# Patient Record
Sex: FEMALE | Race: WHITE | Employment: FULL TIME | URBAN - METROPOLITAN AREA
[De-identification: names, ages, dates, MRNs, and addresses within clinical notes are randomized per-mention and may not be internally consistent; named-entity substitution may affect disease eponyms.]

---

## 2018-03-29 ENCOUNTER — OFFICE VISIT (OUTPATIENT)
Dept: OBGYN CLINIC | Age: 23
End: 2018-03-29

## 2018-03-29 VITALS
BODY MASS INDEX: 35.01 KG/M2 | HEIGHT: 68 IN | WEIGHT: 231 LBS | SYSTOLIC BLOOD PRESSURE: 140 MMHG | DIASTOLIC BLOOD PRESSURE: 80 MMHG

## 2018-03-29 DIAGNOSIS — Z09 POSTOP CHECK: Primary | ICD-10-CM

## 2018-03-29 PROCEDURE — 99024 POSTOP FOLLOW-UP VISIT: CPT | Performed by: OBSTETRICS & GYNECOLOGY

## 2018-03-29 RX ORDER — DOXYCYCLINE 100 MG/1
TABLET ORAL
COMMUNITY
Start: 2018-03-15 | End: 2019-06-11

## 2018-03-29 RX ORDER — IBUPROFEN 600 MG/1
600 TABLET ORAL
COMMUNITY
Start: 2018-03-14 | End: 2019-06-11

## 2018-07-02 ENCOUNTER — HOSPITAL ENCOUNTER (OUTPATIENT)
Age: 23
Setting detail: SPECIMEN
Discharge: HOME | End: 2018-07-02
Payer: COMMERCIAL

## 2018-07-02 ENCOUNTER — OFFICE VISIT (OUTPATIENT)
Dept: OBGYN CLINIC | Age: 23
End: 2018-07-02
Payer: COMMERCIAL

## 2018-07-02 VITALS
BODY MASS INDEX: 33.86 KG/M2 | HEIGHT: 68 IN | DIASTOLIC BLOOD PRESSURE: 70 MMHG | SYSTOLIC BLOOD PRESSURE: 118 MMHG | WEIGHT: 223.4 LBS

## 2018-07-02 DIAGNOSIS — N89.8 VAGINAL DISCHARGE: ICD-10-CM

## 2018-07-02 DIAGNOSIS — Z01.419 VISIT FOR GYNECOLOGIC EXAMINATION: Primary | ICD-10-CM

## 2018-07-02 LAB
DIRECT EXAM: NORMAL
Lab: NORMAL
SPECIMEN DESCRIPTION: NORMAL
STATUS: NORMAL

## 2018-07-02 PROCEDURE — 99395 PREV VISIT EST AGE 18-39: CPT | Performed by: OBSTETRICS & GYNECOLOGY

## 2018-07-02 ASSESSMENT — ENCOUNTER SYMPTOMS
BACK PAIN: 0
ABDOMINAL DISTENTION: 0
ABDOMINAL PAIN: 0
CONSTIPATION: 0
COUGH: 0
SHORTNESS OF BREATH: 0
DIARRHEA: 0

## 2018-07-02 NOTE — PROGRESS NOTES
Subjective:      Patient ID: Ming Martinez is a 26 y.o. female. Other   Pertinent negatives include no abdominal pain, chest pain, congestion, coughing, fatigue or headaches. Ming Martinez is a 26 y.o. Napolean Gilbert, here for her annual exam.  The patient was seen and examined. The patients past medical, surgical, social and family history were reviewed. Current medications and allergies were reviewed, and documented in the chart. The patient is now 3 months after her miscarriage and her menses have gone back to monthly using 2 or 3 pads per day. She still says she needs no birth control and she is not sexually active. She works in a [employer] in it's very hot in the summer  She has a white discharge. She was told by her PCP in the past that she had a bacterial infection was treated with Bactrim which turned out to be an allergy. Menstrual history: Monthly, using 2-3 pads  Birth control: Not desired    Blood pressure 118/70, height 5' 8\" (1.727 m), weight 223 lb 6.4 oz (101.3 kg), last menstrual period 06/11/2018, not currently breastfeeding. Wt Readings from Last 3 Encounters:   07/02/18 223 lb 6.4 oz (101.3 kg)   03/29/18 231 lb (104.8 kg)     No results found for this or any previous visit (from the past 8736 hour(s)). No past medical history on file.                                                                 Past Surgical History:   Procedure Laterality Date    DILATION AND CURETTAGE OF UTERUS  03/14/2018    missed AB     Family History   Problem Relation Age of Onset    No Known Problems Father      History   Smoking Status    Never Smoker   Smokeless Tobacco    Never Used     History   Alcohol Use    Yes     Comment: socially       MEDICATIONS:  Current Outpatient Prescriptions   Medication Sig Dispense Refill    doxycycline monohydrate (ADOXA) 100 MG tablet       ibuprofen (ADVIL;MOTRIN) 600 MG tablet Take 600 mg by mouth       No current facility-administered medications for this visit. ALLERGIES:  Patient has no known allergies. Review of Systems   Constitutional: Negative for appetite change and fatigue. HENT: Negative for congestion and hearing loss. Eyes: Negative for visual disturbance. Respiratory: Negative for cough and shortness of breath. Cardiovascular: Negative for chest pain and palpitations. Gastrointestinal: Negative for abdominal distention, abdominal pain, constipation and diarrhea. Genitourinary: Negative for flank pain, frequency, menstrual problem, pelvic pain and vaginal discharge. Musculoskeletal: Negative for back pain. Neurological: Negative for syncope and headaches. Psychiatric/Behavioral: Negative for behavioral problems. Objective:   Physical Exam   Constitutional: She is oriented to person, place, and time. She appears well-developed and well-nourished. Eyes: Pupils are equal, round, and reactive to light. Neck: No tracheal deviation present. No thyromegaly present. Cardiovascular: Normal rate, regular rhythm and normal heart sounds. Pulmonary/Chest: Effort normal and breath sounds normal. No respiratory distress. Right breast exhibits no inverted nipple. Left breast exhibits no inverted nipple, no mass, no nipple discharge, no skin change and no tenderness. Breasts are symmetrical.   Abdominal: Soft. Bowel sounds are normal. She exhibits no distension and no mass. There is no tenderness. There is no CVA tenderness. Hernia confirmed negative in the right inguinal area and confirmed negative in the left inguinal area. Genitourinary: No breast swelling, tenderness, discharge or bleeding. There is no rash or lesion on the right labia. There is no rash or lesion on the left labia. Uterus is not deviated, not enlarged and not fixed. Cervix exhibits no motion tenderness, no discharge and no friability. Right adnexum displays no mass, no tenderness and no fullness.  Left adnexum displays no mass, no tenderness and no

## 2018-07-20 LAB
CYTOLOGY REPORT: NORMAL
HPV SAMPLE: NORMAL
HPV SOURCE: NORMAL
HPV, GENOTYPE 16: NOT DETECTED
HPV, GENOTYPE 18: NOT DETECTED
HPV, HIGH RISK OTHER: NOT DETECTED
HPV, INTERPRETATION: NORMAL

## 2019-06-11 ENCOUNTER — OFFICE VISIT (OUTPATIENT)
Dept: FAMILY MEDICINE CLINIC | Age: 24
End: 2019-06-11
Payer: COMMERCIAL

## 2019-06-11 VITALS
DIASTOLIC BLOOD PRESSURE: 80 MMHG | SYSTOLIC BLOOD PRESSURE: 132 MMHG | HEIGHT: 67 IN | BODY MASS INDEX: 35.63 KG/M2 | OXYGEN SATURATION: 98 % | HEART RATE: 76 BPM | WEIGHT: 227 LBS | TEMPERATURE: 97.2 F

## 2019-06-11 DIAGNOSIS — Z23 NEED FOR HPV VACCINE: ICD-10-CM

## 2019-06-11 DIAGNOSIS — Z11.4 SCREENING FOR HIV (HUMAN IMMUNODEFICIENCY VIRUS): ICD-10-CM

## 2019-06-11 DIAGNOSIS — L30.9 DERMATITIS: Primary | ICD-10-CM

## 2019-06-11 DIAGNOSIS — R42 LIGHT HEADED: ICD-10-CM

## 2019-06-11 DIAGNOSIS — Z76.89 ESTABLISHING CARE WITH NEW DOCTOR, ENCOUNTER FOR: ICD-10-CM

## 2019-06-11 DIAGNOSIS — E66.9 CLASS 2 OBESITY WITHOUT SERIOUS COMORBIDITY WITH BODY MASS INDEX (BMI) OF 35.0 TO 35.9 IN ADULT, UNSPECIFIED OBESITY TYPE: ICD-10-CM

## 2019-06-11 PROCEDURE — G8417 CALC BMI ABV UP PARAM F/U: HCPCS | Performed by: NURSE PRACTITIONER

## 2019-06-11 PROCEDURE — 99203 OFFICE O/P NEW LOW 30 MIN: CPT | Performed by: NURSE PRACTITIONER

## 2019-06-11 RX ORDER — AMMONIUM LACTATE 12 G/100G
LOTION TOPICAL
Qty: 57 G | Refills: 0 | Status: SHIPPED | OUTPATIENT
Start: 2019-06-11

## 2019-06-11 ASSESSMENT — ENCOUNTER SYMPTOMS
BACK PAIN: 0
SHORTNESS OF BREATH: 0
BLOOD IN STOOL: 0
CHEST TIGHTNESS: 0
ABDOMINAL PAIN: 0
NAUSEA: 0
VOMITING: 0
COLOR CHANGE: 0
COUGH: 0
DIARRHEA: 0
CONSTIPATION: 0
SORE THROAT: 0
APNEA: 0

## 2019-06-11 ASSESSMENT — PATIENT HEALTH QUESTIONNAIRE - PHQ9
SUM OF ALL RESPONSES TO PHQ9 QUESTIONS 1 & 2: 0
SUM OF ALL RESPONSES TO PHQ QUESTIONS 1-9: 0
1. LITTLE INTEREST OR PLEASURE IN DOING THINGS: 0
2. FEELING DOWN, DEPRESSED OR HOPELESS: 0
SUM OF ALL RESPONSES TO PHQ QUESTIONS 1-9: 0

## 2019-06-11 NOTE — PROGRESS NOTES
[address]  [place], [address]  [phone]      Isabel De Souza is a 22 y.o. female who presents today for her  medicalconditions/complaints as noted below. Isabel De Souza is c/o of New Patient (has been a while since seeing ) and Rash (bilateral hands,flank. 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 has been feeling lightheaded lately but does work thirds,drinking water)  . HPI:    Pt here to [facility]. Lives here in [facility] and works in [address] [place] [address] in a [employer] Pt also c/o of being light headed for past few weeks. She has had no change in work routine, sleep wake cycle, diet, exercise regimen and feels she eats every few hours. She does drink plenty of water. She states she has felt like she could pass out but has not done so. She does have random headaches but nothing consistent or r/t light headedness. Rash   This is a chronic problem. The current episode started more than 1 year ago (since possible around age 16). The problem has been waxing and waning since onset. The affected locations include the left hand and right hand. The rash is characterized by dryness and itchiness. She was exposed to nothing. Pertinent negatives include no anorexia, congestion, cough, diarrhea, fatigue, fever, joint pain, shortness of breath, sore throat or vomiting. Past treatments include topical steroids and moisturizer. The treatment provided no relief. (Pt has seen 3 derm's with no solid dx, she states this comes and goes with temp extremes, does not seem to be affected by her job)       No past medical history on file.    Past Surgical History:   Procedure Laterality Date    DILATION AND CURETTAGE OF UTERUS  03/14/2018    missed AB     Family History   Problem Relation Age of Onset    No Known Problems Mother     No Known Problems Father     No Known Problems Sister     No Known Problems Brother     No Known Problems Sister     No Known Problems Sister      Social History     Tobacco Use    Smoking status: Never Smoker    Smokeless tobacco: Never Used   Substance Use Topics    Alcohol use: Yes     Comment: socially      Current Outpatient Medications   Medication Sig Dispense Refill    ammonium lactate (LAC-HYDRIN) 12 % lotion Apply topically daily. 57 g 0     No current facility-administered medications for this visit. Allergies   Allergen Reactions    Bactrim [Sulfamethoxazole-Trimethoprim]        Health Maintenance   Topic Date Due    HPV vaccine (1 - Female 3-dose series) 02/03/2011    HIV screen  02/03/2011    Chlamydia screen  02/03/2012    Flu vaccine (Season Ended) 09/01/2019    Cervical cancer screen  07/02/2021    DTaP/Tdap/Td vaccine (7 - Td) 12/02/2026    Varicella Vaccine  Completed    Pneumococcal 0-64 years Vaccine  Aged Out       Subjective:      Review of Systems   Constitutional: Negative for activity change, appetite change, chills, diaphoresis, fatigue and fever. HENT: Negative for congestion and sore throat. Eyes: Negative for visual disturbance. Respiratory: Negative for apnea, cough, chest tightness and shortness of breath. Cardiovascular: Negative for chest pain, palpitations and leg swelling. Gastrointestinal: Negative for abdominal pain, anorexia, blood in stool, constipation, diarrhea, nausea and vomiting. Genitourinary: Negative for difficulty urinating and menstrual problem (normal light cycles each month). Musculoskeletal: Negative for back pain, joint pain and myalgias. Skin: Positive for rash. Negative for color change and pallor. Neurological: Positive for light-headedness and headaches. Negative for dizziness, syncope, speech difficulty, weakness and numbness. Hematological: Negative for adenopathy. Psychiatric/Behavioral: Negative for dysphoric mood and sleep disturbance. The patient is not nervous/anxious.           Objective:      Physical Exam   Constitutional: She is oriented to person, place, and time. She appears well-developed and well-nourished. No distress. HENT:   Head: Normocephalic and atraumatic. Neck: Neck supple. No JVD present. No thyromegaly present. Cardiovascular: Normal rate, regular rhythm, normal heart sounds and intact distal pulses. No LE edema   Pulmonary/Chest: Effort normal and breath sounds normal.   Lymphadenopathy:     She has no cervical adenopathy. Neurological: She is alert and oriented to person, place, and time. Skin: Skin is warm and dry. Rash (papular rash to bilateral tops of hands with very mild erythema, possible eczema?) noted. She is not diaphoretic. No pallor. Psychiatric: She has a normal mood and affect. Her behavior is normal. Judgment and thought content normal.   Nursing note and vitals reviewed. Assessment:       Diagnosis Orders   1. Dermatitis  ammonium lactate (LAC-HYDRIN) 12 % lotion    CBC Auto Differential   2. Establishing care with new doctor, encounter for     3. Light headed  Comprehensive Metabolic Panel    CBC Auto Differential    TSH without Reflex   4. Class 2 obesity without serious comorbidity with body mass index (BMI) of 35.0 to 35.9 in adult, unspecified obesity type  IA CALC BMI ABV UP SATYA F/U   5. Screening for HIV (human immunodeficiency virus)  HIV Screen     Wt Readings from Last 3 Encounters:   06/11/19 227 lb (103 kg)   07/02/18 223 lb 6.4 oz (101.3 kg)   03/29/18 231 lb (104.8 kg)     BP Readings from Last 3 Encounters:   06/11/19 132/80   07/02/18 118/70   03/29/18 (!) 140/80       Plan:      Return if symptoms worsen or fail to improve.   Orders Placed This Encounter   Procedures    Comprehensive Metabolic Panel     Standing Status:   Future     Standing Expiration Date:   6/10/2020    CBC Auto Differential     Standing Status:   Future     Standing Expiration Date:   6/10/2020    TSH without Reflex     Standing Status:   Future     Standing Expiration Date:   6/10/2020    HIV Screen Standing Status:   Future     Standing Expiration Date:   6/10/2020    VA CALC BMI ABV UP SATYA F/U     Orders Placed This Encounter   Medications    ammonium lactate (LAC-HYDRIN) 12 % lotion     Sig: Apply topically daily. Dispense:  57 g     Refill:  0     Health Maintenance reviewed - patient asked to schedule her pap smear ( had ASc-US on last pap). 1st HPV vaccine given today, f/u 2 months for 2nd    Rash:\"  Will try lac hydrin BID for now as it appears dry, she has tried rx and otc hydrocortisone with only short term results  Advised to make f/u appt if flares up again and will do punch bx    Light headed:  Continue to eat every few hours with colorful foods  push fluids ( water, Gatorade, tea), and rest as much as able  Check other labs  Will call with test results    Patient advised to follow heart healthy, low fat  diet and 150 minutes of cardiovascular exercise per week     Patient given educational materials - see patient instructions. Discussed use,benefit, and side effects of prescribed medications. All patient questions answered. Pt voiced understanding. Reviewed health maintenance. Instructed to continue currentmedications, diet and exercise.     Electronically signed by Melvi Condon CNP on 6/11/2019 at 9:31 AM

## 2019-06-11 NOTE — PROGRESS NOTES
Visit Information    Have you changed or started any medications since your last visit including any over-the-counter medicines, vitamins, or herbal medicines? no   Have you stopped taking any of your medications? Is so, why? -  no  Are you having any side effects from any of your medications? - no    Have you seen any other physician or provider since your last visit?  no   Have you had any other diagnostic tests since your last visit?  no   Have you been seen in the emergency room and/or had an admission in a hospital since we last saw you?  no   Have you had your routine dental cleaning in the past 6 months?  no     Do you have an active MyChart account? If no, what is the barrier? No: na    Patient Care Team:  VERO Doyle CNP as PCP - General (Family Nurse Practitioner)    Medical History Review  Past Medical, Family, and Social History reviewed and  contribute to the patient presenting condition    Health Maintenance   Topic Date Due    Varicella Vaccine (1 of 2 - 13+ 2-dose series) 02/03/2009    HPV vaccine (1 - Female 3-dose series) 02/03/2011    HIV screen  02/03/2011    Chlamydia screen  02/03/2012    DTaP/Tdap/Td vaccine (1 - Tdap) 02/03/2015    Flu vaccine (Season Ended) 09/01/2019    Cervical cancer screen  07/02/2021    Pneumococcal 0-64 years Vaccine  Aged Out     After obtaining consent, and per orders of Lola Gonzalez CNP, injection of Gardasil given in Right deltoid by Isaiah . Patient instructed to remain in clinic for 20 minutes afterwards, and to report any adverse reaction to me immediately.

## 2019-06-13 ENCOUNTER — HOSPITAL ENCOUNTER (OUTPATIENT)
Age: 24
Setting detail: SPECIMEN
Discharge: HOME | End: 2019-06-13
Payer: COMMERCIAL

## 2019-06-13 DIAGNOSIS — Z11.4 SCREENING FOR HIV (HUMAN IMMUNODEFICIENCY VIRUS): ICD-10-CM

## 2019-06-13 DIAGNOSIS — R42 LIGHT HEADED: ICD-10-CM

## 2019-06-13 DIAGNOSIS — L30.9 DERMATITIS: ICD-10-CM

## 2019-06-13 LAB
ABSOLUTE EOS #: 0.22 K/UL (ref 0–0.44)
ABSOLUTE IMMATURE GRANULOCYTE: 0.04 K/UL (ref 0–0.3)
ABSOLUTE LYMPH #: 3.05 K/UL (ref 1.1–3.7)
ABSOLUTE MONO #: 1.01 K/UL (ref 0.1–1.2)
BASOPHILS # BLD: 1 % (ref 0–2)
BASOPHILS ABSOLUTE: 0.07 K/UL (ref 0–0.2)
DIFFERENTIAL TYPE: NORMAL
EOSINOPHILS RELATIVE PERCENT: 2 % (ref 1–4)
HCT VFR BLD CALC: 42.8 % (ref 36.3–47.1)
HEMOGLOBIN: 12.8 G/DL (ref 11.9–15.1)
IMMATURE GRANULOCYTES: 0 %
LYMPHOCYTES # BLD: 33 % (ref 24–43)
MCH RBC QN AUTO: 27 PG (ref 25.2–33.5)
MCHC RBC AUTO-ENTMCNC: 29.9 G/DL (ref 28.4–34.8)
MCV RBC AUTO: 90.3 FL (ref 82.6–102.9)
MONOCYTES # BLD: 11 % (ref 3–12)
NRBC AUTOMATED: 0 PER 100 WBC
PDW BLD-RTO: 13.6 % (ref 11.8–14.4)
PLATELET # BLD: 319 K/UL (ref 138–453)
PLATELET ESTIMATE: NORMAL
PMV BLD AUTO: 11.3 FL (ref 8.1–13.5)
RBC # BLD: 4.74 M/UL (ref 3.95–5.11)
RBC # BLD: NORMAL 10*6/UL
SEG NEUTROPHILS: 53 % (ref 36–65)
SEGMENTED NEUTROPHILS ABSOLUTE COUNT: 5 K/UL (ref 1.5–8.1)
WBC # BLD: 9.4 K/UL (ref 3.5–11.3)
WBC # BLD: NORMAL 10*3/UL

## 2019-06-14 LAB
ALBUMIN SERPL-MCNC: 4.7 G/DL (ref 3.5–5.2)
ALBUMIN/GLOBULIN RATIO: 1.8 (ref 1–2.5)
ALP BLD-CCNC: 60 U/L (ref 35–104)
ALT SERPL-CCNC: 28 U/L (ref 5–33)
ANION GAP SERPL CALCULATED.3IONS-SCNC: 15 MMOL/L (ref 9–17)
AST SERPL-CCNC: 26 U/L
BILIRUB SERPL-MCNC: 0.32 MG/DL (ref 0.3–1.2)
BUN BLDV-MCNC: 16 MG/DL (ref 6–20)
BUN/CREAT BLD: NORMAL (ref 9–20)
CALCIUM SERPL-MCNC: 9.5 MG/DL (ref 8.6–10.4)
CHLORIDE BLD-SCNC: 102 MMOL/L (ref 98–107)
CO2: 21 MMOL/L (ref 20–31)
CREAT SERPL-MCNC: 0.7 MG/DL (ref 0.5–0.9)
GFR AFRICAN AMERICAN: >60 ML/MIN
GFR NON-AFRICAN AMERICAN: >60 ML/MIN
GFR SERPL CREATININE-BSD FRML MDRD: NORMAL ML/MIN/{1.73_M2}
GFR SERPL CREATININE-BSD FRML MDRD: NORMAL ML/MIN/{1.73_M2}
GLUCOSE BLD-MCNC: 77 MG/DL (ref 70–99)
HIV AG/AB: NONREACTIVE
POTASSIUM SERPL-SCNC: 4.4 MMOL/L (ref 3.7–5.3)
SODIUM BLD-SCNC: 138 MMOL/L (ref 135–144)
TOTAL PROTEIN: 7.3 G/DL (ref 6.4–8.3)
TSH SERPL DL<=0.05 MIU/L-ACNC: 3.09 MIU/L (ref 0.3–5)

## 2019-08-16 ENCOUNTER — OFFICE VISIT (OUTPATIENT)
Dept: FAMILY MEDICINE CLINIC | Age: 24
End: 2019-08-16
Payer: COMMERCIAL

## 2019-08-16 VITALS
DIASTOLIC BLOOD PRESSURE: 77 MMHG | TEMPERATURE: 97.3 F | WEIGHT: 224 LBS | BODY MASS INDEX: 35.16 KG/M2 | OXYGEN SATURATION: 99 % | HEART RATE: 76 BPM | SYSTOLIC BLOOD PRESSURE: 116 MMHG | HEIGHT: 67 IN

## 2019-08-16 DIAGNOSIS — M79.641 RIGHT HAND PAIN: Primary | ICD-10-CM

## 2019-08-16 DIAGNOSIS — M25.531 RIGHT WRIST PAIN: ICD-10-CM

## 2019-08-16 DIAGNOSIS — Z23 NEED FOR HPV VACCINE: ICD-10-CM

## 2019-08-16 PROCEDURE — 90651 9VHPV VACCINE 2/3 DOSE IM: CPT | Performed by: NURSE PRACTITIONER

## 2019-08-16 PROCEDURE — 99213 OFFICE O/P EST LOW 20 MIN: CPT | Performed by: NURSE PRACTITIONER

## 2019-08-16 PROCEDURE — 90471 IMMUNIZATION ADMIN: CPT | Performed by: NURSE PRACTITIONER

## 2019-08-16 RX ORDER — PREDNISONE 20 MG/1
20 TABLET ORAL 2 TIMES DAILY
Qty: 10 TABLET | Refills: 0 | Status: SHIPPED | OUTPATIENT
Start: 2019-08-16 | End: 2019-08-21

## 2019-08-16 ASSESSMENT — ENCOUNTER SYMPTOMS: SHORTNESS OF BREATH: 0

## 2019-08-16 NOTE — PROGRESS NOTES
Visit Information    Have you changed or started any medications since your last visit including any over-the-counter medicines, vitamins, or herbal medicines? no   Have you stopped taking any of your medications? Is so, why? -  no  Are you having any side effects from any of your medications? - no    Have you seen any other physician or provider since your last visit?  no   Have you had any other diagnostic tests since your last visit?  no   Have you been seen in the emergency room and/or had an admission in a hospital since we last saw you?  no   Have you had your routine dental cleaning in the past 6 months?  no     Do you have an active MyChart account? If no, what is the barrier? No: na    Patient Care Team:  VERO Nickerson CNP as PCP - General (Family Nurse Practitioner)  VERO Nickerson CNP as PCP - [facility] Provider    Medical History Review  Past Medical, Family, and Social History reviewed and  contribute to the patient presenting condition    Health Maintenance   Topic Date Due    Chlamydia screen  02/03/2012    HPV vaccine (2 - Female 3-dose series) 07/09/2019    Flu vaccine (1) 09/01/2019    Cervical cancer screen  07/02/2021    DTaP/Tdap/Td vaccine (7 - Td) 12/02/2026    Varicella Vaccine  Completed    HIV screen  Completed    Pneumococcal 0-64 years Vaccine  Aged Out     After obtaining consent, and per orders of Grant Ceron CNP, injection of Gardasil given in Left deltoid by Garima Johansen. Patient instructed to remain in clinic for 20 minutes afterwards, and to report any adverse reaction to me immediately.

## 2019-10-29 ENCOUNTER — HOSPITAL ENCOUNTER (OUTPATIENT)
Age: 24
Setting detail: SPECIMEN
Discharge: HOME | End: 2019-10-29
Payer: COMMERCIAL

## 2019-10-29 ENCOUNTER — OFFICE VISIT (OUTPATIENT)
Dept: FAMILY MEDICINE CLINIC | Age: 24
End: 2019-10-29
Payer: COMMERCIAL

## 2019-10-29 VITALS
HEIGHT: 67 IN | HEART RATE: 105 BPM | WEIGHT: 231 LBS | SYSTOLIC BLOOD PRESSURE: 116 MMHG | TEMPERATURE: 98.1 F | DIASTOLIC BLOOD PRESSURE: 70 MMHG | BODY MASS INDEX: 36.26 KG/M2 | OXYGEN SATURATION: 100 %

## 2019-10-29 DIAGNOSIS — Z23 FLU VACCINE NEED: ICD-10-CM

## 2019-10-29 DIAGNOSIS — Z11.8 SCREENING FOR CHLAMYDIAL DISEASE: ICD-10-CM

## 2019-10-29 DIAGNOSIS — Z12.4 SCREENING FOR CERVICAL CANCER: Primary | ICD-10-CM

## 2019-10-29 PROCEDURE — 90686 IIV4 VACC NO PRSV 0.5 ML IM: CPT | Performed by: NURSE PRACTITIONER

## 2019-10-29 PROCEDURE — 90471 IMMUNIZATION ADMIN: CPT | Performed by: NURSE PRACTITIONER

## 2019-10-29 PROCEDURE — 99395 PREV VISIT EST AGE 18-39: CPT | Performed by: NURSE PRACTITIONER

## 2019-10-30 LAB
C TRACH DNA GENITAL QL NAA+PROBE: NEGATIVE
SPECIMEN DESCRIPTION: NORMAL

## 2019-11-11 LAB — CYTOLOGY REPORT: NORMAL

## 2019-11-26 ENCOUNTER — NURSE ONLY (OUTPATIENT)
Dept: FAMILY MEDICINE CLINIC | Age: 24
End: 2019-11-26
Payer: COMMERCIAL

## 2019-11-26 DIAGNOSIS — Z23 NEED FOR HPV VACCINE: Primary | ICD-10-CM

## 2019-11-26 PROCEDURE — 90651 9VHPV VACCINE 2/3 DOSE IM: CPT | Performed by: NURSE PRACTITIONER

## 2019-11-26 PROCEDURE — 90471 IMMUNIZATION ADMIN: CPT | Performed by: NURSE PRACTITIONER

## 2021-08-28 ENCOUNTER — HOSPITAL ENCOUNTER (OUTPATIENT)
Age: 26
Setting detail: SPECIMEN
Discharge: HOME | End: 2021-08-28
Payer: COMMERCIAL

## 2021-08-28 ENCOUNTER — OFFICE VISIT (OUTPATIENT)
Dept: PRIMARY CARE CLINIC | Age: 26
End: 2021-08-28
Payer: COMMERCIAL

## 2021-08-28 VITALS
OXYGEN SATURATION: 98 % | HEIGHT: 68 IN | HEART RATE: 85 BPM | DIASTOLIC BLOOD PRESSURE: 83 MMHG | SYSTOLIC BLOOD PRESSURE: 122 MMHG | TEMPERATURE: 97.2 F | BODY MASS INDEX: 35.12 KG/M2

## 2021-08-28 DIAGNOSIS — R06.02 SOB (SHORTNESS OF BREATH): ICD-10-CM

## 2021-08-28 DIAGNOSIS — J01.90 ACUTE SINUSITIS, RECURRENCE NOT SPECIFIED, UNSPECIFIED LOCATION: ICD-10-CM

## 2021-08-28 DIAGNOSIS — R05.9 COUGH: Primary | ICD-10-CM

## 2021-08-28 PROCEDURE — 99213 OFFICE O/P EST LOW 20 MIN: CPT | Performed by: PHYSICIAN ASSISTANT

## 2021-08-28 PROCEDURE — G8427 DOCREV CUR MEDS BY ELIG CLIN: HCPCS | Performed by: PHYSICIAN ASSISTANT

## 2021-08-28 PROCEDURE — G8417 CALC BMI ABV UP PARAM F/U: HCPCS | Performed by: PHYSICIAN ASSISTANT

## 2021-08-28 PROCEDURE — 1036F TOBACCO NON-USER: CPT | Performed by: PHYSICIAN ASSISTANT

## 2021-08-28 RX ORDER — BENZONATATE 200 MG/1
200 CAPSULE ORAL 3 TIMES DAILY PRN
Qty: 21 CAPSULE | Refills: 0 | Status: SHIPPED | OUTPATIENT
Start: 2021-08-28 | End: 2021-09-04

## 2021-08-28 RX ORDER — AMOXICILLIN AND CLAVULANATE POTASSIUM 875; 125 MG/1; MG/1
1 TABLET, FILM COATED ORAL 2 TIMES DAILY
Qty: 20 TABLET | Refills: 0 | Status: SHIPPED | OUTPATIENT
Start: 2021-08-28 | End: 2021-09-07

## 2021-08-28 ASSESSMENT — PATIENT HEALTH QUESTIONNAIRE - PHQ9
SUM OF ALL RESPONSES TO PHQ QUESTIONS 1-9: 0
2. FEELING DOWN, DEPRESSED OR HOPELESS: 0
1. LITTLE INTEREST OR PLEASURE IN DOING THINGS: 0
SUM OF ALL RESPONSES TO PHQ QUESTIONS 1-9: 0
SUM OF ALL RESPONSES TO PHQ QUESTIONS 1-9: 0
SUM OF ALL RESPONSES TO PHQ9 QUESTIONS 1 & 2: 0

## 2021-08-28 NOTE — PATIENT INSTRUCTIONS
Patient Education        Coronavirus ([id number]): Care Instructions  Overview  The coronavirus disease (COVID-19) is caused by a virus. Symptoms may include a fever, a cough, and shortness of breath. It mainly spreads person-to-person through droplets from coughing and sneezing. The virus also can spread when people are in close contact with someone who is infected. Most people have mild symptoms and can take care of themselves at home. If their symptoms get worse, they may need care in a hospital. Treatment may include medicines to reduce symptoms, plus breathing support such as oxygen therapy or a ventilator. It's important to not spread the virus to others. If you have COVID-19, wear a face cover anytime you are around other people. It can help stop the spread of the virus. You need to isolate yourself while you are sick. Leave your home only if you need to get medical care or testing. Follow-up care is a key part of your treatment and safety. Be sure to make and go to all appointments, and call your doctor if you are having problems. It's also a good idea to know your test results and keep a list of the medicines you take. How can you care for yourself at home? · Get extra rest. It can help you feel better. · Drink plenty of fluids. This helps replace fluids lost from fever. Fluids also help ease a scratchy throat. Water, soup, fruit juice, and hot tea with lemon are good choices. · Take acetaminophen (such as Tylenol) to reduce a fever. It may also help with muscle aches. Read and follow all instructions on the label. · Use petroleum jelly on sore skin. This can help if the skin around your nose and lips becomes sore from rubbing a lot with tissues. If you use oxygen, use a water-based product instead of petroleum jelly. Tips for self-isolation  · Limit contact with people in your home. If possible, stay in a separate bedroom and use a separate bathroom.   · Wear a cloth face cover when you are around out (lose consciousness) or are very hard to wake up. Call your doctor now or seek immediate medical care if:    · You have moderate trouble breathing. (You can't speak a full sentence.)     · You are coughing up blood (more than about 1 teaspoon).     · You have signs of low blood pressure. These include feeling lightheaded; being too weak to stand; and having cold, pale, clammy skin. Watch closely for changes in your health, and be sure to contact your doctor if:    · Your symptoms get worse.     · You are not getting better as expected. Call before you go to the doctor's office. Follow their instructions. And wear a cloth face cover. Current as of: March 26, 2021               Content Version: 12.9  © 2006-2021 Healthwise, Incorporated. Care instructions adapted under license by [facility] ([facility]). If you have questions about a medical condition or this instruction, always ask your healthcare professional. [place] [address] any warranty or liability for your use of this information. Patient Education        Cough: Care Instructions  Your Care Instructions     A cough is your body's response to something that bothers your throat or airways. Many things can cause a cough. You might cough because of a cold or the flu, bronchitis, or asthma. Smoking, postnasal drip, allergies, and stomach acid that backs up into your throat also can cause coughs. A cough is a symptom, not a disease. Most coughs stop when the cause, such as a cold, goes away. You can take a few steps at home to cough less and feel better. Follow-up care is a key part of your treatment and safety. Be sure to make and go to all appointments, and call your doctor if you are having problems. It's also a good idea to know your test results and keep a list of the medicines you take. How can you care for yourself at home? · Drink lots of water and other fluids. This helps thin the mucus and soothes a dry or sore throat.  Honey or lemon juice in hot water or tea may ease a dry cough. · Take cough medicine as directed by your doctor. · Prop up your head on pillows to help you breathe and ease a dry cough. · Try cough drops to soothe a dry or sore throat. Cough drops don't stop a cough. Medicine-flavored cough drops are no better than candy-flavored drops or hard candy. · Do not smoke. Avoid secondhand smoke. If you need help quitting, talk to your doctor about stop-smoking programs and medicines. These can increase your chances of quitting for good. When should you call for help? Call 911 anytime you think you may need emergency care. For example, call if:    · You have severe trouble breathing. Call your doctor now or seek immediate medical care if:    · You cough up blood.     · You have new or worse trouble breathing.     · You have a new or higher fever.     · You have a new rash. Watch closely for changes in your health, and be sure to contact your doctor if:    · You cough more deeply or more often, especially if you notice more mucus or a change in the color of your mucus.     · You have new symptoms, such as a sore throat, an earache, or sinus pain.     · You do not get better as expected. Where can you learn more? Go to https://Formabilio.Admatic. org and sign in to your [employer] account. Enter D279 in the [facility] box to learn more about \"Cough: Care Instructions. \"     If you do not have an account, please click on the \"Sign Up Now\" link. Current as of: October 26, 2020               Content Version: 12.9  © 5637-5016 [employer]. Care instructions adapted under license by [facility] ([facility]). If you have questions about a medical condition or this instruction, always ask your healthcare professional. [place] [address] any warranty or liability for your use of this information.          Patient Education        Sinusitis: Care Instructions  Your Care Instructions Sinusitis is an infection of the lining of the sinus cavities in your head. Sinusitis often follows a cold. It causes pain and pressure in your head and face. In most cases, sinusitis gets better on its own in 1 to 2 weeks. But some mild symptoms may last for several weeks. Sometimes antibiotics are needed. Follow-up care is a key part of your treatment and safety. Be sure to make and go to all appointments, and call your doctor if you are having problems. It's also a good idea to know your test results and keep a list of the medicines you take. How can you care for yourself at home? · Take an over-the-counter pain medicine, such as acetaminophen (Tylenol), ibuprofen (Advil, Motrin), or naproxen (Aleve). Read and follow all instructions on the label. · If the doctor prescribed antibiotics, take them as directed. Do not stop taking them just because you feel better. You need to take the full course of antibiotics. · Be careful when taking over-the-counter cold or flu medicines and Tylenol at the same time. Many of these medicines have acetaminophen, which is Tylenol. Read the labels to make sure that you are not taking more than the recommended dose. Too much acetaminophen (Tylenol) can be harmful. · Breathe warm, moist air from a steamy shower, a hot bath, or a sink filled with hot water. Avoid cold, dry air. Using a humidifier in your home may help. Follow the directions for cleaning the machine. · Use saline (saltwater) nasal washes. This can help keep your nasal passages open and wash out mucus and bacteria. You can buy saline nose drops at a grocery store or drugstore. Or you can make your own at home by adding 1 teaspoon of salt and 1 teaspoon of baking soda to 2 cups of distilled water. If you make your own, fill a bulb syringe with the solution, insert the tip into your nostril, and squeeze gently. Rometta Mering your nose.   · Put a hot, wet towel or a warm gel pack on your face 3 or 4 times a day for 5 to 10 minutes each time. · Try a decongestant nasal spray like oxymetazoline (Afrin). Do not use it for more than 3 days in a row. Using it for more than 3 days can make your congestion worse. When should you call for help? Call your doctor now or seek immediate medical care if:    · You have new or worse swelling or redness in your face or around your eyes.     · You have a new or higher fever. Watch closely for changes in your health, and be sure to contact your doctor if:    · You have new or worse facial pain.     · The mucus from your nose becomes thicker (like pus) or has new blood in it.     · You are not getting better as expected. Where can you learn more? Go to https://Eyebrid Blaze.SoSocio. org and sign in to your [employer] account. Enter C124 in the [employer] box to learn more about \"Sinusitis: Care Instructions. \"     If you do not have an account, please click on the \"Sign Up Now\" link. Current as of: December 2, 2020               Content Version: 12.9  © 8612-2874 [employer]. Care instructions adapted under license by [facility] ([facility]). If you have questions about a medical condition or this instruction, always ask your healthcare professional. [address] any warranty or liability for your use of this information. Patient Education        Saline Nasal Washes: Care Instructions  Your Care Instructions     Saline nasal washes help keep the nasal passages open by washing out thick or dried mucus. This simple remedy can help relieve symptoms of allergies, sinusitis, and colds. It also can make the nose feel more comfortable by keeping the mucous membranes moist. You may notice a little burning sensation in your nose the first few times you use the solution, but this usually gets better in a few days. Follow-up care is a key part of your treatment and safety.  Be sure to make and go to all appointments, and call your doctor if you are having problems. It's also a good idea to know your test results and keep a list of the medicines you take. How can you care for yourself at home? · You can buy premixed saline solution in a squeeze bottle or other sinus rinse products at a drugstore. Read and follow the instructions on the label. · You also can make your own saline solution by adding 1 teaspoon of salt and 1 teaspoon of baking soda to 2 cups of distilled water. · If you use a homemade solution, pour a small amount into a clean bowl. Using a rubber bulb syringe, squeeze the syringe and place the tip in the salt water. Pull a small amount of the salt water into the syringe by relaxing your hand. · Sit down with your head tilted slightly back. Do not lie down. Put the tip of the bulb syringe or the squeeze bottle a little way into one of your nostrils. Gently drip or squirt a few drops into the nostril. Repeat with the other nostril. Some sneezing and gagging are normal at first.  · Gently blow your nose. · Wipe the syringe or bottle tip clean after each use. · Repeat this 2 or 3 times a day. · Use nasal washes gently if you have nosebleeds often. When should you call for help? Watch closely for changes in your health, and be sure to contact your doctor if:    · You often get nosebleeds.     · You have problems doing the nasal washes. Where can you learn more? Go to https://Intuitive Web SolutionspeMiNOWireless.Boxcar. org and sign in to your [employer] account. Enter [phone] in the [facility] box to learn more about \"Saline Nasal Washes: Care Instructions. \"     If you do not have an account, please click on the \"Sign Up Now\" link. Current as of: December 2, 2020               Content Version: 12.9  © 7815-0665 Healthwise, Incorporated. Care instructions adapted under license by [facility] ([facility]).  If you have questions about a medical condition or this instruction, always ask your healthcare professional. Brady Machado disclaims any warranty or liability for your use of this information.

## 2021-08-28 NOTE — LETTER
[place] [address]  Phone: [phone]  Fax: [phone]    Beatriz Bragg        August 28, 2021     Patient: Fausto Aguilar   YOB: 1995   Date of Visit: 8/28/2021       To Whom it May Concern:    Fausto Aguilar was seen in my clinic on 8/28/2021. She is to remain off work until her [place] comes back negative    If you have any questions or concerns, please don't hesitate to call.     Sincerely,         Chary Bello, DAVID

## 2021-08-28 NOTE — PROGRESS NOTES
Surgical History:   Procedure Laterality Date    DILATION AND CURETTAGE OF UTERUS  03/14/2018    missed AB         CURRENT MEDICATIONS       Current Outpatient Medications   Medication Sig Dispense Refill    amoxicillin-clavulanate (AUGMENTIN) 875-125 MG per tablet Take 1 tablet by mouth 2 times daily for 10 days 20 tablet 0    benzonatate (TESSALON) 200 MG capsule Take 1 capsule by mouth 3 times daily as needed for Cough 21 capsule 0    ammonium lactate (LAC-HYDRIN) 12 % lotion Apply topically daily. (Patient not taking: Reported on 8/28/2021) 57 g 0     No current facility-administered medications for this visit. ALLERGIES     Bactrim [sulfamethoxazole-trimethoprim]    FAMILY HISTORY           Problem Relation Age of Onset    No Known Problems Mother     No Known Problems Father     No Known Problems Sister     No Known Problems Brother     No Known Problems Sister     No Known Problems Sister      Family Status   Relation Name Status    Mother  Alive    Father  Alive    Sister  Alive   [place] Brother  Alive    Sister  Alive    Sister  Alive          SOCIAL HISTORY      reports that she has never smoked. She has never used smokeless tobacco. She reports current alcohol use. She reports that she does not use drugs. PHYSICAL EXAM    (up to 7 for level 4, 8 or more for level 5)     Vitals:    08/28/21 1044   BP: 122/83   Site: Right Upper Arm   Position: Sitting   Cuff Size: Large Adult   Pulse: 85   Temp: 97.2 °F (36.2 °C)   TempSrc: Infrared   SpO2: 98%   Height: 5' 8\" (1.727 m)         Physical Exam  Vitals and nursing note reviewed. Constitutional:       General: She is not in acute distress. Appearance: Normal appearance. She is not ill-appearing. HENT:      Head: Normocephalic and atraumatic. Right Ear: External ear normal.      Left Ear: External ear normal.      Nose: Congestion and rhinorrhea present.       Mouth/Throat:      Mouth: Mucous membranes are moist.      Pharynx: ICU for COVID-19? Answer:   No     Order Specific Question:   Employed in healthcare setting? Answer:   No     Order Specific Question:   Resident in a congregate (group) care setting? Answer:   No     Order Specific Question:   Pregnant? Answer:   No     Order Specific Question:   Previously tested for COVID-19? Answer:   Yes       No results found for this visit on 08/28/21. FINALIMPRESSION      Visit Diagnoses and Associated Orders     Cough    -  Primary    COVID-19 [[id number] Custom]   - Future Order    XR CHEST STANDARD (2 VW) [[id number] Custom]   - Future Order         SOB (shortness of breath)        COVID-19 [[id number] Custom]   - Future Order    XR CHEST STANDARD (2 VW) [[id number] Custom]   - Future Order         Acute sinusitis, recurrence not specified, unspecified location        COVID-19 [[id number] Custom]   - Future Order    XR CHEST STANDARD (2 VW) [[id number] Custom]   - Future Order         ORDERS WITHOUT AN ASSOCIATED DIAGNOSIS    amoxicillin-clavulanate (AUGMENTIN) 875-125 MG per tablet [[id number]]      benzonatate (TESSALON) 200 MG capsule [[id number]]              PLAN     Return if symptoms worsen or fail to improve. DISCHARGEMEDICATIONS:  Orders Placed This Encounter   Medications    amoxicillin-clavulanate (AUGMENTIN) 875-125 MG per tablet     Sig: Take 1 tablet by mouth 2 times daily for 10 days     Dispense:  20 tablet     Refill:  0    benzonatate (TESSALON) 200 MG capsule     Sig: Take 1 capsule by mouth 3 times daily as needed for Cough     Dispense:  21 capsule     Refill:  0         Plan:.  Specimen sent for a culture. Possible treatment alteration based on the results. Based on the duration and severity of the symptoms-- I will treat this as bacterial at this time. Patient instructed to complete antibiotic prescription fully. May use Motrin/Tylenol for fever/pain. Saline washes, salt water gargles and over the counter preparations if desired.   Patient agreeable to treatment plan.  Educational materials provided on AVS.  Follow up if symptoms do not improve/worsen. Patient instructed to return to the office if symptoms worsen, return, or have any other concerns. Patient understands and is agreeable.          Linnea Garcia PA-C 8/29/2021 8:36 PM

## 2021-08-29 DIAGNOSIS — J01.90 ACUTE SINUSITIS, RECURRENCE NOT SPECIFIED, UNSPECIFIED LOCATION: ICD-10-CM

## 2021-08-29 DIAGNOSIS — R06.02 SOB (SHORTNESS OF BREATH): ICD-10-CM

## 2021-08-29 DIAGNOSIS — R05.9 COUGH: ICD-10-CM

## 2021-08-29 ASSESSMENT — ENCOUNTER SYMPTOMS
RHINORRHEA: 1
SINUS PAIN: 0
CHEST TIGHTNESS: 0
SINUS PRESSURE: 1
GASTROINTESTINAL NEGATIVE: 1
COUGH: 1
SHORTNESS OF BREATH: 1
EYES NEGATIVE: 1

## 2021-08-30 LAB
SARS-COV-2: NORMAL
SARS-COV-2: NOT DETECTED
SOURCE: NORMAL

## 2021-10-26 ENCOUNTER — HOSPITAL ENCOUNTER (OUTPATIENT)
Age: 26
Discharge: HOME | End: 2021-10-26
Payer: OTHER GOVERNMENT

## 2021-10-26 ENCOUNTER — HOSPITAL ENCOUNTER (OUTPATIENT)
Dept: GENERAL RADIOLOGY | Age: 26
Discharge: HOME | End: 2021-10-28
Payer: OTHER GOVERNMENT

## 2021-10-26 DIAGNOSIS — T14.90XA INJURY: ICD-10-CM

## 2021-10-26 PROCEDURE — 73564 X-RAY EXAM KNEE 4 OR MORE: CPT

## 2021-11-29 ENCOUNTER — HOSPITAL ENCOUNTER (OUTPATIENT)
Dept: MRI IMAGING | Age: 26
Discharge: HOME | End: 2021-12-01
Payer: OTHER GOVERNMENT

## 2021-11-29 DIAGNOSIS — S80.01XA CONTUSION OF RIGHT KNEE, INITIAL ENCOUNTER: ICD-10-CM

## 2021-11-29 PROCEDURE — 73721 MRI JNT OF LWR EXTRE W/O DYE: CPT

## 2021-12-16 ENCOUNTER — HOSPITAL ENCOUNTER (OUTPATIENT)
Dept: PHYSICAL THERAPY | Age: 26
Setting detail: THERAPIES SERIES
Discharge: HOME | End: 2021-12-16
Payer: OTHER GOVERNMENT

## 2021-12-16 PROCEDURE — 97162 PT EVAL MOD COMPLEX 30 MIN: CPT

## 2021-12-16 PROCEDURE — 97110 THERAPEUTIC EXERCISES: CPT

## 2021-12-16 NOTE — CONSULTS
[x] [facility]        Outpatient Physical                Therapy       [address]       Phone: [phone]       Fax: [phone] [] [facility]       Promotion at [address] [facility]       Phone: [phone]       Fax: [phone] [] [facility] Promotion    [address]     Phone: [phone]     Fax:  [phone]     Physical Therapy Lower Extremity Evaluation    Date:  2021  Patient: Jose Alonzo  : 1995  MRN: [id number]  Physician: Shabana Auguste MD   Insurance: [facility], 3x/2 weeks (6 visit) - approved for [id number] currently only, visits through 22)  Medical Diagnosis: Abrasion, right knee, initial encounter; Contusion of right knee, initial encounter  Rehab Codes: M25.661, M25.561, R53.1, R26.9, R27.9  Onset date: 21  Next 's appt. : 1/3/22        Subjective:   CC: Pt arrives for physical therapy evaluation of R knee sprain - works as a  for [employer], and was stepping down onto a customer's \"pseudo\" step which was a cinderblock, coming down and cinderblock flipped up and she fell directly onto a concrete slab on the right knee. No pop felt, immediate swelling and bleeding present as well as numbness. Notes she could hardly walk but she did finish her route, and then next day was bruised and even more swollen and could hardly walk on it. This bruising/swelling resolved over the course of a few weeks, but has had lingering pain that has not resolved and has been continually limping on R knee since injury, over 3 months ago.     HPI: No prior hx of knee injuries bilat    Prior Level of Function: independent with all ADL/IADLs, no  pain or difficulty     Current Level of Function: knee giving out when walking especially with stair climbing, unable to kneel or deep squat, difficulty with sitting/standing long period of time - family does sometimes get help up steps into home or assist with longer walking, trouble getting in/out of tub shower      Red Flags:      Yes  No Comments   Fatigue [] [x]     Fever/chills/sweats [] [x]    Malaise  [] [x]    Mental status change [] [x]     Paresthesias/numbness/weakness [x] [] Intermittent numbness throughout entire knee   Unexplained weight changes [] [x]     Lightheaded/dizziness [] [x]    Shortness of breath [] [x]         Home Environment: Lives with mom and sisters  in [place] house with 2 LACEY and no railings at entry. 1st-floor bathroom with 1st-floor bedroom. Laundry on main floor, pt getting some assist but mostly completing independently. Available assistive devices: N/A        PMHx: [] Unremarkable [] Diabetes [] HTN  [] Pacemaker   [] MI/Heart Problems [] Cancer [] Arthritis [] Other:              [] Refer to full medical chart  In EPIC   Tests: [] X-Ray: [x] MRI:  11/29/21:   Impression   1.  No evidence of acute internal derangement in the right knee.       2.  Mild laxity of the lateral collateral ligament with intermediate signal   at the distal insertion, possibly related to a remote injury.       3.  Focal subcutaneous edema and skin lesion ventral to the proximal patellar   tendon, nonspecific but likely contusion, given history.       4.  Deep chondral fissuring in the patellar cartilage. 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  Present surrounding knee cap but also into posterior knee as well. Pain altered Tx:  [] Yes  [] No  Action:  Symptoms:  [] Improving [] Worsening [x] Same  Better:  Heat/cold  Worse: sitting, standing longer periods, laying down flat with legs out on ottoman  Sleep: [x] OK    [] Disturbed    Patient Goals:  To heal and get back to work        OBJECTIVE:    Observation:  OBSERVATION No Deficit Deficit Not Tested Comments   Posture   x   Reduced RLE weightbearing in standing, knee flexed in stance   Palpation [] [x] [] TTP diffusely at all landmarks throughout R knee   Sensation [x] [] [] No deficit noted at eval today though pt reports \"feels different\" but not numbness at anterior patella, patellar tendon on R as compared to L   Edema [x] [] []  No gross deficit appreciated         Neurological [] [] [x]     Patellar Mobility [] [x] [] TTP, apprehensive   Patellar Orientation [x] [] []     Gait [] [x] []  Antalgic gait on RLE - decreased RLE stance time w/ decreased LLE swing, consistent slight KF in RLE throughout stance and swing with limited active movement at knee             ROM  ° A/P STRENGTH    Left Right Left Right   Hip Flex   5 4+   Ext   5- 3-   ABD   4- 4   ADD       Knee Flx 120/125 110*/115* 5 5-*   Ext Hyper 4P 0* 5 4+*   Ankle DF       PF       INV       EVER              * = pain with testing    Special Tests: Positive: ligamentous laxity noted at baseline bilat for all M/L and A/P ligaments, pain with all testing d/t high pain with any movement (passive or active) of R knee      Negative: None       Functional Tests: HELD d/t high pain levels, antalgic      Functional Assessment    FUNCTION Normal Difficult Unable Comments   Sitting [] [x] []     Standing [] [x] []     Ambulation [] [x] []     Groom/Dress [] [x] []     Lift/Carry [] [x] []     Stairs [] [x] []     Bending [] [x] []     Squat [] [x] []     Kneel [] [] [x]           Assessment: Amanda Stevenson is a 24 yo female who presents with R knee sprain after direct fall onto knee while at work - presents with significant hypersensitivity throughout all landmarks of R knee, end-range pain and ROM limitations in flex/ext of R knee, and significantly reduced quad strength on RLE secondary to disuse and lacking full extension ROM - pt will benefit from skilled physical therapy to address these deficits and promote return to PLOF including work duties which involves prolonged ambulation, stair climbing, and carrying. Moderate complexity evaluation justified d/t high pain levels resulting in overly positive special testing, resulting in unclear and potentially evolving pathology. Problems:    [x] ? Pain:     [x] ? ROM:    [x] ? Strength:    [x] ? Function:    [x] ? Balance  [x] Edema: mild effusion  [x] Postural Deviations  [x] Gait Deviations  [] Other:        STG: (to be met in 6 treatments)  ? Pain: No more than 6/10 max pain post treatments to indicate improving tolerance to increased mobility/movement at knee   ? ROM: R knee ROM to at least hyper 2 - 120deg without more than mild increase in pain at end range to indicate improving R knee ROM post injury progressing to symmetry  ? Strength:   R quad able to complete rapid quad set with smooth coordinated movement and superior patellar glide and maintain for 10s without difficulty to progress end range knee ext strength for gait  At least 4+/5 grossly in R hip to indicate improving strength for stability in SLS conditions like gait, stair climbing  ?  Function:  Pt able to achieve near equal stance time and full TKE on RLE within gait cycle with appropriate KF in swing on RLE to progress towards efficient gait mechanics  Pt able to ascend/descend stairs reciprocally with unilat UE assist with fair speed and balance noted   Independent with Home Exercise Programs  Demonstrate Knowledge of fall prevention                     Patient goals: return to work without pain      Outcome Measure on Initial Eval:  LEFI: 25/80, 31% function     Additional Outcome Measures Used: None        Rehab Potential:  [x] Good  [] Fair  [] Poor   Suggested Professional Referral:  [x] No  [] Yes:  Barriers to Goal Achievement[de-identified]  [] No  [x] Yes: condition becoming chronic, chronic lack of mobility and significantly decreased strength, beginning to demo hypersensitivity throughout entire knee  Domestic Concerns:  [x] No  [] Yes:    Pt. Education:  [x] Plans/Goals, Risks/Benefits discussed  [x] Home exercise program    Method of Education: [x] Verbal  [x] Demo  [x] Written  Comprehension of Education:  [x] Verbalizes understanding. [x] Demonstrates understanding. [] Needs Review. [] Demonstrates/verbalizes understanding of HEP/Ed previously given. Treatment Plan:  [x] Therapeutic Exercise   43682  [] Iontophoresis: 4 mg/mL Dexamethasone Sodium Phosphate  mAmin  39047   [x] Therapeutic Activity  59511 [x] Vasopneumatic cold with compression  79642    [x] Gait Training   77649 [] Ultrasound   03127   [x] Neuromuscular Re-education  50436 [] Electrical Stimulation Unattended  42639   [x] Manual Therapy  04526 [] Electrical Stimulation Attended  34352   [x] Instruction in HEP  [] Dry Needling   [] Aquatic Therapy   01754 [x] Cold/hotpack    [] Massage   33085      [] Lumbar/Cervical Traction  85616     []  Medication allergies reviewed for use of    Dexamethasone Sodium Phosphate 4mg/ml     with iontophoresis treatments. Pt is not allergic. Frequency:  3 x/week for 6 visits per [facility] - highly suspected will require additional visits. Todays Treatment:  Modalities:   Precautions:  Exercises:  All below given as written HEP:  Exercise Reps/ Time Weight/ Level Comments   Knee ext prop stretch 30\"  Very painful   Quad set 10x3\"  Extremely weak   Hamstring iso 10x3\"           LAQs 20x     Seated knee flexion slide on towel 20x     Standing WS onto R knee w/ TKE 10x3\"  Tactile cues required; muscle fasciculation noted d/t weakness   Other: Pt education provided re: pathology, need for desensitization and instruction on proper completion, PT POC to address impairments and progress to fxn - billed with therex    Specific Instructions for next treatment:   - Knee ROM focus with repetitive focus, not cracking on motion  - QUAD STRENGTH      Evaluation Complexity:  History (Personal factors, comorbidities) [x] 0 [] 1-2 [] 3+   Exam (limitations, restrictions) [] 1-2 [] 3 [x] 4+   Clinical presentation (progression) [] Stable [x] Evolving  [] Unstable   Decision Making [] Low [x] Moderate [] High    [] Low Complexity [x] Moderate Complexity [] High Complexity       Treatment Charges: Mins Units   [x] Evaluation       []  Low       [x]  Moderate       []  High 30 1   []  Modalities     [x]  Ther Exercise 18 1   []  Manual Therapy     []  Ther Activities     []  Aquatics     []  Vasocompression     []  Other       TOTAL TREATMENT TIME: 48 min    Time in:3:07 pm   Time Out:4:00 pm    Electronically signed by: Bisi Paul PT        Physician Signature:________________________________Date:__________________  By signing above or cosigning this note, I have reviewed this plan of care and certify a need for medically necessary rehabilitation services.      *PLEASE SIGN ABOVE AND FAX BACK ALL PAGES*

## 2021-12-20 ENCOUNTER — HOSPITAL ENCOUNTER (OUTPATIENT)
Dept: PHYSICAL THERAPY | Facility: CLINIC | Age: 26
Setting detail: THERAPIES SERIES
Discharge: HOME | End: 2021-12-20
Payer: OTHER GOVERNMENT

## 2021-12-20 PROCEDURE — 97110 THERAPEUTIC EXERCISES: CPT

## 2021-12-20 PROCEDURE — 97016 VASOPNEUMATIC DEVICE THERAPY: CPT

## 2021-12-20 NOTE — FLOWSHEET NOTE
[] [facility] &  Therapy  [address]  [phone]  F: [phone] [] [address]  [place] [address]   [address]  P: [phone]  F: [phone] [] [place]  [address]  P: [phone]  F: [phone] [] [address]  P: [phone]  F: [phone] [] [address]  [facility]   [address]   [place]: [phone]  F: [phone]      Physical Therapy Daily Treatment Note    Date:  2021  Patient Name:  Marybeth Kelly    :  1995  MRN: [id number]  Physician: Carrie Shaffer MD                             Insurance: [facility], 3x/2 weeks (6 visit) - approved for [id number] currently only, visits through 22)  Medical Diagnosis: Abrasion, right knee, initial encounter; Contusion of right knee, initial encounter  Rehab Codes: M25.661, M25.561, R53.1, R26.9, R27.9  Onset date: 21               Next 's appt. : 1/3/22  Visit# / total visits: 2/6   Cancels/No Shows: 0/0    Subjective:    Pain:  [x] Yes  [] No Location: R knee Pain Rating: (0-10 scale) 5/10  Pain altered Tx:  [] No  [x] Yes  Action:slow progression  Comments:    Objective:  Modalities: Vasocompression end session x15 min min compression - 34°   Precautions:  Exercises:  All below given as written HEP:  Exercise Reps/ Time Weight/ Level Comments   True bike  7 min            Supine      HS stretch  4x30\"     Gastroc stretch 4x30\"     Knee ext prop stretch 30\"   Very painful   Quad set- long seated 20x5\"  strap Extremely weak   Hamstring iso 20x5\"  strap           Prone       Quad stretch 3x30\" manual    HS curl 2x10 A          Sidelying      Hip abduction 2x  Painful   clams 2x10           Seated         LAQs 20x       Seated knee flexion slide on towel 20x       Standing WS onto R knee w/ TKE 10x3\"   Tactile cues required; muscle fasciculation noted d/t weakness   Gastroc stretch 3x30\"  slant    Other: Pt education provided re: pathology, need for desensitization and instruction on proper completion, PT POC to address impairments and progress to fxn - billed with therex     Specific Instructions for next treatment:   - Knee ROM focus with repetitive focus, not cracking on motion  - QUAD STRENGTH          Treatment Charges: Mins Units Time In/Out   []  Modalities        []  Ther Exercise 37 2 8:11- 8:48   []  Neuromuscular Re-ed      []  Gait Training      []  Manual Therapy      []  Ther Activities      []  Aquatics      []  Vasocompression 15 1 8:51 - 9:06   []  Cervical Traction      []  Other      Total Treatment time 52 min     325-883 warm up not billed for      Assessment: [x] Progressing toward goals. Pt with poor tolerance to treatment resulting in slow transitioning and minimal progressions. Pt fearful of movement in R knee. Cueing of all forms to ensure proper completion of interventions. Provided patient with HEP to further progress towards goals. Ended with vasocompression to address pain and edema. [] No change.      [] Other:  [x] Patient would continue to benefit from skilled physical therapy services in order to: improve ROM, strength and pain to promote return to PLOF including work duties which involves prolonged ambulation, stair climbing, and carrying       STG: (to be met in 6 treatments)  1. ? Pain: No more than 6/10 max pain post treatments to indicate improving tolerance to increased mobility/movement at knee   2. ? ROM: R knee ROM to at least hyper 2 - 120deg without more than mild increase in pain at end range to indicate improving R knee ROM post injury progressing to symmetry  3. ? Strength:   a. R quad able to complete rapid quad set with smooth coordinated movement and superior patellar glide and maintain for 10s without difficulty to progress end range knee ext strength for gait  b. At least 4+/5 grossly in R hip to indicate improving strength for stability in SLS conditions like gait, stair climbing  4. ? Function:  a. Pt able to achieve near equal stance time and full TKE on RLE within gait cycle with appropriate KF in swing on RLE to progress towards efficient gait mechanics  b. Pt able to ascend/descend stairs reciprocally with unilat UE assist with fair speed and balance noted   5. Independent with Home Exercise Programs  6. Demonstrate Knowledge of fall prevention                       Patient goals: return to work without pain    Pt. Education:  [x] Yes  [] No  [] Reviewed Prior HEP/Ed  Method of Education: [x] Verbal  [x] Demo  [x] Written  Access Code: [email or web address]  URL: [email or web address]  Date: 12/20/2021  Prepared by: Ang Riley    Exercises  Clamshell - 1 x daily - 7 x weekly - 2 sets - 10 reps  Long Sitting Calf Stretch with Strap - 1 x daily - 7 x weekly - 1 sets - 4 reps - 30 hold  Supine Heel Slide - 1 x daily - 7 x weekly - 2 sets - 10 reps  Long Sitting Quad Set - 1 x daily - 7 x weekly - 2 sets - 10 reps - 5 hold  Supine Isometric Hamstring Set - 1 x daily - 7 x weekly - 2 sets - 10 reps - 5 hold  Small Range Straight Leg Raise - 1 x daily - 7 x weekly - 2 sets - 10 reps  Seated Long Arc Quad - 1 x daily - 7 x weekly - 2 sets - 10 reps  Supine Hamstring Stretch with Strap - 1 x daily - 7 x weekly - 1 sets - 4 reps - 30 hold  Prone Knee Flexion - 1 x daily - 7 x weekly - 2 sets - 10 reps  Prone Quadriceps Stretch with Strap - 1 x daily - 7 x weekly - 2 sets - 10 reps    Comprehension of Education:  [x] Verbalizes understanding. [x] Demonstrates understanding. [x] Needs review. [] Demonstrates/verbalizes HEP/Ed previously given. Plan: [x] Continue current frequency toward long and short term goals. [x] Specific Instructions for subsequent treatments: Progress as able.        Time In:802          Time Out: 907 am    Electronically signed by:  Jerzy Branch PTA

## 2021-12-22 ENCOUNTER — HOSPITAL ENCOUNTER (OUTPATIENT)
Dept: PHYSICAL THERAPY | Facility: CLINIC | Age: 26
Setting detail: THERAPIES SERIES
Discharge: HOME | End: 2021-12-22
Payer: OTHER GOVERNMENT

## 2021-12-22 PROCEDURE — 97110 THERAPEUTIC EXERCISES: CPT

## 2021-12-22 NOTE — FLOWSHEET NOTE
[] [facility] TWEL[facility]el[facility]EP [facility] &  Therapy  [address]  [phone]  F: [phone] [] [address]  [facility]   [address]  P: [phone]  F: [phone] [] [address] &  Therapy  [address] [facility]  P: [phone]  F: [phone] [] [address]  P: [phone]  F: [phone] [] [address]  [facility]   [address]   [place]: [phone]  F: [phone]      Physical Therapy Daily Treatment Note    Date:  2021  Patient Name:  Shirley Barros    :  1995  MRN: [id number]  Physician: Keo Palma MD                             Insurance: [facility], 3x/2 weeks (6 visit) - approved for [id number] currently only, visits through 22)  Medical Diagnosis: Abrasion, right knee, initial encounter; Contusion of right knee, initial encounter  Rehab Codes: M25.661, M25.561, R53.1, R26.9, R27.9  Onset date: 21               Next 's appt. : 1/3/22  Visit# / total visits: 3/6   Cancels/No Shows: 0/0    Subjective:    Pain:  [x] Yes  [] No Location: R knee Pain Rating: (0-10 scale) 5/10  Pain altered Tx:  [x] No  [] Yes  Action:slow progression  Comments: Pt arrives denying changes. Still high pain levels. Objective:  Modalities: Gel CP to R knee end session.  15 min   Precautions:  Exercises:   Exercise Reps/ Time Weight/ Level Comments   True bike  6 min            Supine      HS stretch  4x30\"  strap    Gastroc stretch - long seated  4x30\"  strap    Knee ext prop stretch 30\"   Very painful   Quad set- long seated 20x5\"  Extremely weak   Hamstring iso 20x5\"       SLR  2x5           Prone       Quad stretch 3x30\" manual    HS curl 2x10 A    Hip extension 2x10 A Small range         Sidelying      Hip abduction 15 AA Painful   clams 2x10 Seated         LAQs 20x       Seated knee flexion slide on towel 20x             Standing      Standing WS onto R knee w/ TKE 10x3\"   Tactile cues required; muscle fasciculation noted d/t weakness   Gastroc stretch 3x30\"  slant    Other: Pt education provided re: pathology, need for desensitization and instruction on proper completion, PT POC to address impairments and progress to fxn - billed with therex     Specific Instructions for next treatment:   - Knee ROM focus with repetitive focus, not cracking on motion  - QUAD STRENGTH          Treatment Charges: Mins Units Time In/Out   [x]  Modalities - CP  15  0 8:48 - 9:03   []  Ther Exercise 40 3 8:07- 8:47   []  Neuromuscular Re-ed      []  Gait Training      []  Manual Therapy      []  Ther Activities      []  Vasocompression      Total Treatment time 40min 3    800-806 warm up not billed for      Assessment: [] Progressing toward goals. [x] No change. Continued slow progressions through out session. Increased time required for transitions this date. Pt continues to be hesitant and fearful of pain. Pt somewhat self limiting as well. Required cueing to complete interventions properly. Ended with CP to address pain. [] Other:  [x] Patient would continue to benefit from skilled physical therapy services in order to: improve ROM, strength and pain to promote return to PLOF including work duties which involves prolonged ambulation, stair climbing, and carrying       STG: (to be met in 6 treatments)  ? Pain: No more than 6/10 max pain post treatments to indicate improving tolerance to increased mobility/movement at knee   ? ROM: R knee ROM to at least hyper 2 - 120deg without more than mild increase in pain at end range to indicate improving R knee ROM post injury progressing to symmetry  ?  Strength:   R quad able to complete rapid quad set with smooth coordinated movement and superior patellar glide and maintain for 10s without difficulty to progress end range knee ext strength for gait  At least 4+/5 grossly in R hip to indicate improving strength for stability in SLS conditions like gait, stair climbing  ? Function:  Pt able to achieve near equal stance time and full TKE on RLE within gait cycle with appropriate KF in swing on RLE to progress towards efficient gait mechanics  Pt able to ascend/descend stairs reciprocally with unilat UE assist with fair speed and balance noted   Independent with Home Exercise Programs  Demonstrate Knowledge of fall prevention                       Patient goals: return to work without pain    Pt. Education:  [x] Yes  [] No  [] Reviewed Prior HEP/Ed  Method of Education: [x] Verbal  [x] Demo  [] Written  Access Code: [email or web address]  URL: [email or web address]  Date: 12/20/2021  Prepared by: Tamiko Stroud    Exercises  Clamshell - 1 x daily - 7 x weekly - 2 sets - 10 reps  Long Sitting Calf Stretch with Strap - 1 x daily - 7 x weekly - 1 sets - 4 reps - 30 hold  Supine Heel Slide - 1 x daily - 7 x weekly - 2 sets - 10 reps  Long Sitting Quad Set - 1 x daily - 7 x weekly - 2 sets - 10 reps - 5 hold  Supine Isometric Hamstring Set - 1 x daily - 7 x weekly - 2 sets - 10 reps - 5 hold  Small Range Straight Leg Raise - 1 x daily - 7 x weekly - 2 sets - 10 reps  Seated Long Arc Quad - 1 x daily - 7 x weekly - 2 sets - 10 reps  Supine Hamstring Stretch with Strap - 1 x daily - 7 x weekly - 1 sets - 4 reps - 30 hold  Prone Knee Flexion - 1 x daily - 7 x weekly - 2 sets - 10 reps  Prone Quadriceps Stretch with Strap - 1 x daily - 7 x weekly - 2 sets - 10 reps    Comprehension of Education:  [x] Verbalizes understanding. [x] Demonstrates understanding. [x] Needs review. [] Demonstrates/verbalizes HEP/Ed previously given. Plan: [x] Continue current frequency toward long and short term goals. [x] Specific Instructions for subsequent treatments: Progress as able.        Time In:800          Time Out: 904 am    Electronically signed by: [place], [place]

## 2021-12-23 ENCOUNTER — HOSPITAL ENCOUNTER (OUTPATIENT)
Dept: PHYSICAL THERAPY | Facility: CLINIC | Age: 26
Setting detail: THERAPIES SERIES
Discharge: HOME | End: 2021-12-23
Payer: OTHER GOVERNMENT

## 2021-12-23 PROCEDURE — 97110 THERAPEUTIC EXERCISES: CPT

## 2021-12-23 NOTE — FLOWSHEET NOTE
[] [facility]) [facility] &  Therapy  [address]  [phone]  F: [phone] [x] [address]  [place] [address]   [address]  P: [phone]  F: [phone] [] [place]  [address]  P: [phone]  F: [phone] [] [address]  P: [phone]  F: [phone] [] [address]  [facility]   [address]   [place]: [phone]  F: [phone]      Physical Therapy Daily Treatment Note    Date:  2021  Patient Name:  Agustina Kendrick    :  1995  MRN: [id number]  Physician: Gato Carlson MD                             Insurance: [facility], 3x/2 weeks (6 visit) - approved for [id number] currently only, visits through 22)  Medical Diagnosis: Abrasion, right knee, initial encounter; Contusion of right knee, initial encounter  Rehab Codes: M25.661, M25.561, R53.1, R26.9, R27.9  Onset date: 21               Next 's appt. : 1/3/22  Visit# / total visits: 4/6   Cancels/No Shows: 0/0    Subjective:    Pain:  [x] Yes  [] No Location: R knee Pain Rating: (0-10 scale) 5/10  Pain altered Tx:  [x] No  [] Yes  Action:slow progression  Comments: Pt arrives denying changes, high pain. Objective:  Modalities: Gel CP to R knee end session.  15 min   Precautions:  Exercises:   Exercise Reps/ Time Weight/ Level Comments   True bike  6 min            Supine      HS stretch  3x30\"  strap    Gastroc stretch - long seated  3x30\"  strap    Knee ext prop stretch 30\"   Very painful   Quad set- long seated 20x5\"  Extremely weak   Hamstring iso 20x5\"       SLR  2x5           Prone       Quad stretch 3x30\" manual    HS curl 2x10 A    Hip extension 2x10 A Small range         Sidelying      Hip abduction 15 AA Painful   clams 2x10 Seated         LAQs 20x       Seated knee flexion slide on towel 20x             Standing      Standing WS onto R knee w/ TKE 10x3\"   Tactile cues required; muscle fasciculation noted d/t weakness   Gastroc stretch 3x30\"  slant    3 way hip - CKC 10x ea           Other: Pt education provided re: pathology, need for desensitization and instruction on proper completion, PT POC to address impairments and progress to fxn - billed with therex     Desensitization- cotton ball, pillow case, towel - self lead   Pt education on purpose and completion  13 min       Specific Instructions for next treatment:   - Knee ROM focus with repetitive focus, not cracking on motion  - QUAD STRENGTH          Treatment Charges: Mins Units Time In/Out   [x]  Modalities - CP  15  0 7:52 - 8:07   [x]  Ther Exercise 46 3 7:07- 7:47   []  Neuromuscular Re-ed      []  Gait Training      []  Manual Therapy      []  Ther Activities      []  Vasocompression      Total Treatment time 46min 3    700-706 warm up not billed for      Assessment: [] Progressing toward goals. [x] No change. Completed desensitization to right knee this date to assist with overall pain. Pt was instructed on the purpose and how to complete at home. Educated patient on the importance of compliance at home to assist with progressions during therapy. Continues to be guarded during session. Discussed with patient that incorporated meditation into her HEP may also be beneficial.   Ended with CP to address pain.       [] Other:  [x] Patient would continue to benefit from skilled physical therapy services in order to: improve ROM, strength and pain to promote return to PLOF including work duties which involves prolonged ambulation, stair climbing, and carrying       STG: (to be met in 6 treatments)  1. ? Pain: No more than 6/10 max pain post treatments to indicate improving tolerance to increased mobility/movement at knee   2. ? ROM: R knee ROM to at least hyper 2 - 120deg without more than mild increase in pain at end range to indicate improving R knee ROM post injury progressing to symmetry  3. ? Strength:   a. R quad able to complete rapid quad set with smooth coordinated movement and superior patellar glide and maintain for 10s without difficulty to progress end range knee ext strength for gait  b. At least 4+/5 grossly in R hip to indicate improving strength for stability in SLS conditions like gait, stair climbing  4. ? Function:  a. Pt able to achieve near equal stance time and full TKE on RLE within gait cycle with appropriate KF in swing on RLE to progress towards efficient gait mechanics  b. Pt able to ascend/descend stairs reciprocally with unilat UE assist with fair speed and balance noted   5. Independent with Home Exercise Programs  6. Demonstrate Knowledge of fall prevention                       Patient goals: return to work without pain    Pt. Education:  [x] Yes  [] No  [] Reviewed Prior HEP/Ed  Method of Education: [x] Verbal  [x] Demo  [x] Written  Access Code: [email or web address]  URL: [email or web address]  Date: 12/20/2021  Prepared by: Tobin Litten    Exercises  Clamshell - 1 x daily - 7 x weekly - 2 sets - 10 reps  Long Sitting Calf Stretch with Strap - 1 x daily - 7 x weekly - 1 sets - 4 reps - 30 hold  Supine Heel Slide - 1 x daily - 7 x weekly - 2 sets - 10 reps  Long Sitting Quad Set - 1 x daily - 7 x weekly - 2 sets - 10 reps - 5 hold  Supine Isometric Hamstring Set - 1 x daily - 7 x weekly - 2 sets - 10 reps - 5 hold  Small Range Straight Leg Raise - 1 x daily - 7 x weekly - 2 sets - 10 reps  Seated Long Arc Quad - 1 x daily - 7 x weekly - 2 sets - 10 reps  Supine Hamstring Stretch with Strap - 1 x daily - 7 x weekly - 1 sets - 4 reps - 30 hold  Prone Knee Flexion - 1 x daily - 7 x weekly - 2 sets - 10 reps  Prone Quadriceps Stretch with Strap - 1 x daily - 7 x weekly - 2 sets - 10 reps    Access Code: [email or web address]  URL: [email or web address]  Date: 12/23/2021  Prepared by: Jaylyn Hu    Exercises  Supine Diaphragmatic Breathing - 1 x daily - 7 x weekly - 2 sets - 10 reps  5 Minute Guided Meditation - 1 x daily - 7 x weekly - 2 sets - 10 reps  10 Minute Guided Meditation - 1 x daily - 7 x weekly - 2 sets - 10 reps  15 Minute Guided Meditation - 1 x daily - 7 x weekly - 2 sets - 10 reps  20 Minute Guided Meditation - 1 x daily - 7 x weekly - 2 sets - 10 reps    Patient Education  Rubbing with Different Textures    Comprehension of Education:  [x] Verbalizes understanding. [x] Demonstrates understanding. [x] Needs review. [] Demonstrates/verbalizes HEP/Ed previously given. Plan: [x] Continue current frequency toward long and short term goals. [x] Specific Instructions for subsequent treatments: Progress as able.        Time In:700          Time Out: 8:07am    Electronically signed by:  Sherry Pineda PTA

## 2021-12-27 ENCOUNTER — HOSPITAL ENCOUNTER (OUTPATIENT)
Dept: PHYSICAL THERAPY | Facility: CLINIC | Age: 26
Setting detail: THERAPIES SERIES
Discharge: HOME | End: 2021-12-27
Payer: OTHER GOVERNMENT

## 2021-12-27 PROCEDURE — 97110 THERAPEUTIC EXERCISES: CPT

## 2021-12-27 NOTE — FLOWSHEET NOTE
[] [facility]) [facility] &  Therapy  [address]  [phone]  F: [phone] [x] [address]  [facility]   [address]  P: [phone]  F: [phone] [] [address] &  Therapy  [address]  P: [phone]  F: [phone] [] [address]  P: [phone]  F: [phone] [] [address]  [facility]   [address]   [place]: [phone]  F: [phone]      Physical Therapy Daily Treatment Note    Date:  2021  Patient Name:   Bacilio    :  1995  MRN: [id number]  Physician: Demarcus Becker MD                             Insurance: [facility], 3x/2 weeks (6 visit) - approved for [id number] currently only, visits through 22)  Medical Diagnosis: Abrasion, right knee, initial encounter; Contusion of right knee, initial encounter  Rehab Codes: M25.661, M25.561, R53.1, R26.9, R27.9  Onset date: 21               Next 's appt. : 1/3/22  Visit# / total visits: 5/6   Cancels/No Shows: 0/0    Subjective:    Pain:  [x] Yes  [] No Location: R knee Pain Rating: (0-10 scale) 5-6/10  Pain altered Tx:  [] No  [x] Yes  Action:slow progression  Comments: Pt arrives noting she has been trying to complete the desensitization at home 2-3 times per day (cotton ball, towel, hair brush). Slipped on a dryer sheet last night, knee popped. Ached all night 8/9-10, also sharp pain. Painful to sleep, kept her up all night. Objective:  Modalities: Gel CP to R knee end session.  15 min   Precautions:  Exercises:   Exercise Reps/ Time Weight/ Level Comments   True bike  10 min            Supine      HS stretch  3x30\"  strap    Gastroc stretch - long seated  3x30\"  strap    Knee ext prop stretch 30\"   Very painful   Quad set- long seated 20x5\" Extremely weak   Hamstring iso 20x5\"       SLR  2x5  Improved          Prone       Quad stretch 3x30\" manual    HS curl 2x10 A    Hip extension 2x10 A Small range         Sidelying      Hip abduction 15 AA    clams 2x10           Seated         LAQs 20x       Seated knee flexion slide on towel 20x             Standing      Standing WS onto R knee w/ TKE 10x3\"   Tactile cues required; muscle fasciculation noted d/t weakness   Gastroc stretch 3x30\"  slant    3 way hip - bilateral 15x ea     Step ups - R only 10x 4\"    Step down - R only 8x 4\"    Other: Pt education provided re: pathology, need for desensitization and instruction on proper completion, PT POC to address impairments and progress to fxn - billed with therex     Desensitization- cotton ball, pillow case, towel, lax ball rubber, velcro - self lead   Pt education on purpose and completion  9 min       Specific Instructions for next treatment:   - Knee ROM focus with repetitive focus, not cracking on motion  - QUAD STRENGTH          Treatment Charges: Mins Units Time In/Out   [x]  Modalities - CP  15  0 8:55 - 9:10   [x]  Ther Exercise 45 3 8:10- 8:55   []  Neuromuscular Re-ed      []  Gait Training      []  Manual Therapy      []  Ther Activities      []  Vasocompression      Total Treatment time 45 min 3    800-810 warm up not billed for      Assessment: [] Progressing toward goals. [x] No change. [x] Other: Minimal changes in treatment session this date however small progressions were implemented. Added in steps with increased pain reported on descending stairs. Able to complete bilateral 3 way hip however still required cueing to improve TKE with all kicks. Pt was able to complete SLR this date with improve quad lag however still present. Continued to implement desensitization to improve overall tolerance to interventions. Pt notes LAX ball and velcro were \"awful\". Ended with CP to address pain and edema.    [x] Patient would continue to benefit from skilled physical therapy services in order to: improve ROM, strength and pain to promote return to PLOF including work duties which involves prolonged ambulation, stair climbing, and carrying       STG: (to be met in 6 treatments)  1. ? Pain: No more than 6/10 max pain post treatments to indicate improving tolerance to increased mobility/movement at knee   2. ? ROM: R knee ROM to at least hyper 2 - 120deg without more than mild increase in pain at end range to indicate improving R knee ROM post injury progressing to symmetry  3. ? Strength:   a. R quad able to complete rapid quad set with smooth coordinated movement and superior patellar glide and maintain for 10s without difficulty to progress end range knee ext strength for gait  b. At least 4+/5 grossly in R hip to indicate improving strength for stability in SLS conditions like gait, stair climbing  4. ? Function:  a. Pt able to achieve near equal stance time and full TKE on RLE within gait cycle with appropriate KF in swing on RLE to progress towards efficient gait mechanics  b. Pt able to ascend/descend stairs reciprocally with unilat UE assist with fair speed and balance noted   5. Independent with Home Exercise Programs  6. Demonstrate Knowledge of fall prevention                       Patient goals: return to work without pain    Pt. Education:  [x] Yes  [] No  [x] Reviewed Prior HEP/Ed  Method of Education: [x] Verbal  [x] Demo  [] Written  Access Code: [email or web address]  URL: [email or web address]  Date: 12/20/2021  Prepared by: Venessa Archer    Exercises  Clamshell - 1 x daily - 7 x weekly - 2 sets - 10 reps  Long Sitting Calf Stretch with Strap - 1 x daily - 7 x weekly - 1 sets - 4 reps - 30 hold  Supine Heel Slide - 1 x daily - 7 x weekly - 2 sets - 10 reps  Long Sitting Quad Set - 1 x daily - 7 x weekly - 2 sets - 10 reps - 5 hold  Supine Isometric Hamstring Set - 1 x daily - 7 x weekly - 2 sets - 10 reps - 5 hold  Small Range Straight Leg Raise - 1 x daily - 7 x weekly - 2 sets - 10 reps  Seated Long Arc Quad - 1 x daily - 7 x weekly - 2 sets - 10 reps  Supine Hamstring Stretch with Strap - 1 x daily - 7 x weekly - 1 sets - 4 reps - 30 hold  Prone Knee Flexion - 1 x daily - 7 x weekly - 2 sets - 10 reps  Prone Quadriceps Stretch with Strap - 1 x daily - 7 x weekly - 2 sets - 10 reps    Access Code: [email or web address]  URL: [employer]/  Date: 12/23/2021  Prepared by: Asad Butterfield    Exercises  Supine Diaphragmatic Breathing - 1 x daily - 7 x weekly - 2 sets - 10 reps  5 Minute Guided Meditation - 1 x daily - 7 x weekly - 2 sets - 10 reps  10 Minute Guided Meditation - 1 x daily - 7 x weekly - 2 sets - 10 reps  15 Minute Guided Meditation - 1 x daily - 7 x weekly - 2 sets - 10 reps  20 Minute Guided Meditation - 1 x daily - 7 x weekly - 2 sets - 10 reps    Patient Education  Rubbing with Different Textures    Comprehension of Education:  [x] Verbalizes understanding. [x] Demonstrates understanding. [x] Needs review. [] Demonstrates/verbalizes HEP/Ed previously given. Plan: [x] Continue current frequency toward long and short term goals. [x] Specific Instructions for subsequent treatments: Progress as able.        Time In:800          Time Out: 910am    Electronically signed by:  Marissa Garcia PTA

## 2021-12-28 ENCOUNTER — APPOINTMENT (OUTPATIENT)
Dept: PHYSICAL THERAPY | Facility: CLINIC | Age: 26
End: 2021-12-28
Payer: OTHER GOVERNMENT

## 2021-12-29 ENCOUNTER — HOSPITAL ENCOUNTER (OUTPATIENT)
Dept: PHYSICAL THERAPY | Facility: CLINIC | Age: 26
Setting detail: THERAPIES SERIES
Discharge: HOME | End: 2021-12-29
Payer: OTHER GOVERNMENT

## 2021-12-29 PROCEDURE — 97110 THERAPEUTIC EXERCISES: CPT

## 2021-12-29 NOTE — PROGRESS NOTES
[] [facility]) [facility] &  Therapy  [address]  [phone]  F: [phone] [x] [address]  [facility]   [address]  P: [phone]  F: [phone] [] [address] &  Therapy  [address]  P: [phone]  F: [phone] [] [address]  P: [phone]  F: [phone] [] [address]  [facility]   [address]   [place]: [phone]  F: [phone]      Physical Therapy Progress Note    Date: 2021      Patient: Moisés Iyer  : 1995  MRN: [id number]    Gypsy Lyman MD                             Insurance: [id number], 3x/2 weeks (6 visit) - approved for  currently only, visits through 22)  Medical Diagnosis: Abrasion, right knee, initial encounter; Contusion of right knee, initial encounter  Rehab Codes: M25.661, M25.561, R53.1, R26.9, R27.9  Onset date: 21               Next Dr's appt. : 1/3/22  Visit# / total visits: 6/6            Cancels/No Shows: 0/0  Date range of services: 21 to 21      Subjective:  Pain:  [x]? Yes  []? No   Location: R knee         Pain Rating: (0-10 scale) 5-6/10  Pain altered Tx:  []? No  [x]? Yes  Action:slow progression  Comments: Pt arrives noting knee is sore. Notes she's diligently working on desensitization and exercises but they are extremely painful.  Constant pain, notes that when attempting to straighten knee, it feels that it has to pop and when it sometimes does, it is deep and painful.        Objective:  Test Measurements:  R knee ROM: 2-105 A, 0-108P  R knee strength: 2+/5 ext, 3/5 flex; extremely weak and painful quad set in full extension     R hip strength: 3+/5 throughout all planes, very weak and possibly pain inhibited     Function:  - Severe antalgic gait      Assessment:  []? Progressing toward goals. [x]? No change. [x]? Other: Reassessment this date - pt has made no progress and R knee ROM has actually minorly worsened over the past two weeks. Continued high pain levels, significant antalgic gait, and severe increase in pain with all exercises. Extremely weak quadriceps but difficulty pushing strengthening d/t high pain. Noted to have painful, deep popping as well intermittently. Recommend further therapy to continue mobility and strengthening to tolerance but also reassessment by physician to determine next steps d/t minimal progress and continued high pain levels, significant functional deficits. [x]? Patient would continue to benefit from skilled physical therapy services in order to: improve ROM, strength and pain to promote return to [place] [address] work duties which involves prolonged ambulation, stair climbing, and carrying       STG: (to be met in 6 treatments) - Assessed on 12/29/21 by Tracee Gardiner, PT:  1. ? Pain: No more than 6/10 max pain post treatments to indicate improving tolerance to increased mobility/movement at knee - NOT MET, 7-8/10  2. ? ROM: R knee ROM to at least hyper 2 - 120deg without more than mild increase in pain at end range to indicate improving R knee ROM post injury progressing to symmetry - NOT MET, lacking 10 deg ext at rest and only up to 108deg flexion PROM  3. ? Strength:   a. R quad able to complete rapid quad set with smooth coordinated movement and superior patellar glide and maintain for 10s without difficulty to progress end range knee ext strength for gait - NOT MET, very minimal quad set with pain throughout, unable to achieve 0deg  b. At least 4+/5 grossly in R hip to indicate improving strength for stability in SLS conditions like gait, stair climbing - NOT MET, 3+/5 for all  4. ? Function:  a.  Pt able to achieve near equal stance time and full TKE on RLE within gait cycle with appropriate KF in swing on RLE to progress towards efficient gait mechanics - NOT MET, still significant antalgic gait  b. Pt able to ascend/descend stairs reciprocally with unilat UE assist with fair speed and balance noted - NOT MET, unable to complete reciprocally - extremely difficult to even attempt step up with RLE  5. Independent with Home Exercise Programs  6. Demonstrate Knowledge of fall prevention        RECOMMEND ADDITIONAL THERAPY, EXTEND STG's THROUGH ANOTHER 10 VISITS. Suggested 10 visits d/t anticipated longer therapy need d/t very slow progress, chronicity of condition                       Patient goals: return to work without pain    Treatment Plan: RECOMMEND APPROVAL OF THE FOLLOWING TREATMENT CODES FOR OPTIMAL IMPROVEMENT WITH PHYSICAL THERAPY:  [x] Therapeutic Exercise   14789  [] Iontophoresis: 4 mg/mL Dexamethasone Sodium Phosphate  mAmin  70056   [x] Therapeutic Activity  88160 [x] Vasopneumatic cold with compression  29023    [x] Gait Training   68352 [] Ultrasound   73898   [x] Neuromuscular Re-education  20340 [] Electrical Stimulation Unattended  38293   [x] Manual Therapy  93541 [] Electrical Stimulation Attended  37003   [x] Instruction in HEP  [] Lumbar/Cervical Traction  06545   [] Aquatic Therapy   26111 [x] Cold/hotpack    [] Massage   39158      [] Dry Needling, 1 or 2 muscles  55987   [] Biofeedback, first 15 minutes   89875  [] Biofeedback, additional 15 minutes   93974 [] Dry Needling, 3 or more muscles  62174       Patient Status:     [] Continue per initial plan of care. [x] Additional visits necessary.     [] Other:     Requested Frequency/Duration: 2 times per week for 10 treatments - Suggested 10 visits d/t anticipated longer therapy need d/t very slow progress, chronicity of condition, complexity of condition with central sensitization/allodynia issues        Electronically signed by Bisi Paul PT on 12/29/2021 at 9:03 AM      If you have any questions or concerns, please don't hesitate to call. Thank you for your referral.    Physician Signature:________________________________Date:__________________  By signing above or cosigning this note, I have reviewed this plan of care and certify a need for medically necessary rehabilitation services.      *PLEASE SIGN ABOVE AND FAX BACK ALL PAGES*

## 2021-12-29 NOTE — FLOWSHEET NOTE
[] [facility]) - [facility]  [address]  [phone]  F: [phone] [x] [address]  [employer] [address]   [address]  P: [phone]  F: [phone] [] [place]  [address]  P: [phone]  F: [phone] [] [address]  P: [phone]  F: [phone] [] [address]  [facility]   [address]   [place]: [phone]  F: [phone]      Physical Therapy Daily Treatment Note    Date:  2021  Patient Name:  Papito Caban    :  1995  MRN: [id number]  Physician: Kvng Chawla MD                             Insurance: [facility], 3x/2 weeks (6 visit) - approved for [id number] currently only, visits through 22)  Medical Diagnosis: Abrasion, right knee, initial encounter; Contusion of right knee, initial encounter  Rehab Codes: M25.661, M25.561, R53.1, R26.9, R27.9  Onset date: 21               Next 's appt. : 1/3/22  Visit# / total visits: 6/6   Cancels/No Shows: 0/0    Subjective:    Pain:  [x] Yes  [] No Location: R knee Pain Rating: (0-10 scale) 5-6/10  Pain altered Tx:  [] No  [x] Yes  Action:slow progression  Comments: Pt arrives noting knee is sore. Notes she's diligently working on desensitization and exercises but they are extremely painful. Constant pain, notes that when attempting to straighten knee, it feels that it has to pop and when it sometimes does, it is deep and painful. Objective:  R knee ROM: 2-105 A, 0-108P  R knee strength: 2+/5 ext, 3/5 flex; extremely weak and painful quad set in full extension    R hip strength: 3+/5 throughout all planes, very weak and possibly pain inhibited    Function:  - Severe antalgic gait      Modalities: Gel CP to R knee end session.  15 min Precautions:  Exercises:   Exercise Reps/ Time Weight/ Level Comments   True bike  10 min            Supine      HS stretch  3x30\"  strap    Gastroc stretch - long seated  3x30\"  strap    Knee ext prop stretch 30\"   Very painful   Quad set- long seated 20x5\"  Extremely weak   Hamstring iso 20x5\"       SLR  2x5  Improved          Prone       Quad stretch 3x30\" manual    HS curl 2x10 A    Hip extension 2x10 A Small range         Sidelying      Hip abduction 15 AA    clams 2x10           Seated         LAQs 20x       Seated knee flexion slide on towel 20x             Standing      Standing WS onto R knee w/ TKE 10x3\"   Tactile cues required; muscle fasciculation noted d/t weakness   Gastroc stretch 3x30\"  slant    3 way hip - bilateral 15x ea     Step ups - R only 10x 4\"    Step down - R only 8x 4\"    Other: Limited therex completed d/t time spent in STG assessment, education re: continued impairments and lack of progress, need for continued therapy and possible additional medical intervention/assessment    Desensitization- cotton ball, pillow case, towel, lax ball rubber, velcro - self lead   Pt education on purpose and completion  - HELD on 12/29, verbally reviewed      Specific Instructions for next treatment:   - Knee ROM focus with repetitive focus, not cracking on motion  - QUAD STRENGTH          Treatment Charges: Mins Units Time In/Out   [x]  Modalities - CP  15  0 8:54 - 9:08   [x]  Ther Exercise 41 3 8:12- 8:53   []  Neuromuscular Re-ed      []  Gait Training      []  Manual Therapy      []  Ther Activities      []  Vasocompression      Total Treatment time 45 min 3    [id number] warm up not billed for      Assessment: [] Progressing toward goals. [x] No change. [x] Other: Reassessment this date - pt has made no progress and R knee ROM has actually minorly worsened over the past two weeks. Continued high pain levels, significant antalgic gait, and severe increase in pain with all exercises.  Extremely weak quadriceps but difficulty pushing strengthening d/t high pain. Noted to have painful, deep popping as well intermittently. Recommend further therapy to continue mobility and strengthening to tolerance but also reassessment by physician to determine next steps d/t minimal progress and continued high pain levels, significant functional deficits. [x] Patient would continue to benefit from skilled physical therapy services in order to: improve ROM, strength and pain to promote return to PLOF including work duties which involves prolonged ambulation, stair climbing, and carrying       STG: (to be met in 6 treatments) - Assessed on 12/29/21 by Mark Veras, PT:  1. ? Pain: No more than 6/10 max pain post treatments to indicate improving tolerance to increased mobility/movement at knee - NOT MET, 7-8/10  2. ? ROM: R knee ROM to at least hyper 2 - 120deg without more than mild increase in pain at end range to indicate improving R knee ROM post injury progressing to symmetry - NOT MET, lacking 10 deg ext at rest and only up to 108deg flexion PROM  3. ? Strength:   a. R quad able to complete rapid quad set with smooth coordinated movement and superior patellar glide and maintain for 10s without difficulty to progress end range knee ext strength for gait - NOT MET, very minimal quad set with pain throughout, unable to achieve 0deg  b. At least 4+/5 grossly in R hip to indicate improving strength for stability in SLS conditions like gait, stair climbing - NOT MET, 3+/5 for all  4. ? Function:  a. Pt able to achieve near equal stance time and full TKE on RLE within gait cycle with appropriate KF in swing on RLE to progress towards efficient gait mechanics - NOT MET, still significant antalgic gait  b. Pt able to ascend/descend stairs reciprocally with unilat UE assist with fair speed and balance noted - NOT MET, unable to complete reciprocally - extremely difficult to even attempt step up with RLE  5.  Independent with Home Exercise Programs  6. Demonstrate Knowledge of fall prevention      RECOMMEND ADDITIONAL THERAPY, EXTEND STG's THROUGH ANOTHER 10 VISITS. Suggested 10 visits d/t anticipated longer therapy need d/t very slow progress, chronicity of condition                       Patient goals: return to work without pain    Pt. Education:  [x] Yes  [] No  [x] Reviewed Prior HEP/Ed  Method of Education: [x] Verbal  [x] Demo  [] Written  Access Code: [email or web address]  URL: [email or web address]  Date: 12/20/2021  Prepared by: Carrie Grime    Exercises  Clamshell - 1 x daily - 7 x weekly - 2 sets - 10 reps  Long Sitting Calf Stretch with Strap - 1 x daily - 7 x weekly - 1 sets - 4 reps - 30 hold  Supine Heel Slide - 1 x daily - 7 x weekly - 2 sets - 10 reps  Long Sitting Quad Set - 1 x daily - 7 x weekly - 2 sets - 10 reps - 5 hold  Supine Isometric Hamstring Set - 1 x daily - 7 x weekly - 2 sets - 10 reps - 5 hold  Small Range Straight Leg Raise - 1 x daily - 7 x weekly - 2 sets - 10 reps  Seated Long Arc Quad - 1 x daily - 7 x weekly - 2 sets - 10 reps  Supine Hamstring Stretch with Strap - 1 x daily - 7 x weekly - 1 sets - 4 reps - 30 hold  Prone Knee Flexion - 1 x daily - 7 x weekly - 2 sets - 10 reps  Prone Quadriceps Stretch with Strap - 1 x daily - 7 x weekly - 2 sets - 10 reps    Access Code: [email or web address]  URL: [email or web address]  Date: 12/23/2021  Prepared by: Carrie Grime    Exercises  Supine Diaphragmatic Breathing - 1 x daily - 7 x weekly - 2 sets - 10 reps  5 Minute Guided Meditation - 1 x daily - 7 x weekly - 2 sets - 10 reps  10 Minute Guided Meditation - 1 x daily - 7 x weekly - 2 sets - 10 reps  15 Minute Guided Meditation - 1 x daily - 7 x weekly - 2 sets - 10 reps  20 Minute Guided Meditation - 1 x daily - 7 x weekly - 2 sets - 10 reps    Patient Education  Rubbing with Different Textures    Comprehension of Education:  [x] Verbalizes understanding. [x] Demonstrates understanding. [x] Needs review.   [] Demonstrates/verbalizes HEP/Ed previously given. Plan: [x] Additional visits needed: RECOMMEND ADDITIONAL THERAPY, EXTEND STG's THROUGH ANOTHER 10 VISITS. Suggested 10 visits d/t anticipated longer therapy need d/t very slow progress, chronicity of condition  [x] Specific Instructions for subsequent treatments: Progress as able.        Time In: 8:02 am         Time Out: 9:08 am    Electronically signed by:  Katelynn Leung PT

## 2021-12-30 ENCOUNTER — APPOINTMENT (OUTPATIENT)
Dept: PHYSICAL THERAPY | Facility: CLINIC | Age: 26
End: 2021-12-30
Payer: OTHER GOVERNMENT